# Patient Record
Sex: FEMALE | Race: WHITE
[De-identification: names, ages, dates, MRNs, and addresses within clinical notes are randomized per-mention and may not be internally consistent; named-entity substitution may affect disease eponyms.]

---

## 2018-01-08 ENCOUNTER — HOSPITAL ENCOUNTER (OUTPATIENT)
Dept: HOSPITAL 59 - SUR | Age: 43
LOS: 1 days | Discharge: HOME | End: 2018-01-09
Attending: PLASTIC SURGERY
Payer: MEDICAID

## 2018-01-08 ENCOUNTER — HOSPITAL ENCOUNTER (OUTPATIENT)
Dept: HOSPITAL 59 - SUR | Age: 43
LOS: 1 days | Discharge: HOME | End: 2018-01-09
Attending: SURGERY
Payer: MEDICAID

## 2018-01-08 DIAGNOSIS — M62.08: ICD-10-CM

## 2018-01-08 DIAGNOSIS — M54.9: ICD-10-CM

## 2018-01-08 DIAGNOSIS — K42.9: ICD-10-CM

## 2018-01-08 DIAGNOSIS — K42.9: Primary | ICD-10-CM

## 2018-01-08 DIAGNOSIS — L98.7: Primary | ICD-10-CM

## 2018-01-08 LAB
HCT VFR BLD CALC: 41.6 % (ref 35–47)
HGB BLD-MCNC: 13.7 GM/DL (ref 11.6–16)

## 2018-01-08 PROCEDURE — 85018 HEMOGLOBIN: CPT

## 2018-01-08 PROCEDURE — 49585: CPT

## 2018-01-08 PROCEDURE — 15830 EXC EXCESSIVE SKIN ABDOMEN: CPT

## 2018-01-08 PROCEDURE — 85014 HEMATOCRIT: CPT

## 2018-01-08 PROCEDURE — 00802 ANES PX LWR ABDL WAL PANNIC: CPT

## 2018-01-08 PROCEDURE — 00830 ANES HERNIA RPR LWR ABD NOS: CPT

## 2018-01-08 RX ADMIN — CEFAZOLIN SODIUM SCH MLS/HR: 1 SOLUTION INTRAVENOUS at 23:44

## 2018-01-08 RX ADMIN — TRAMADOL HYDROCHLORIDE PRN MG: 50 TABLET, FILM COATED ORAL at 22:20

## 2018-01-08 RX ADMIN — SODIUM CHLORIDE, SODIUM LACTATE, POTASSIUM CHLORIDE, AND CALCIUM CHLORIDE SCH MLS/HR: .6; .31; .03; .02 INJECTION, SOLUTION INTRAVENOUS at 18:47

## 2018-01-08 RX ADMIN — TRAMADOL HYDROCHLORIDE PRN MG: 50 TABLET, FILM COATED ORAL at 12:56

## 2018-01-08 RX ADMIN — DIAZEPAM PRN MG: 5 TABLET ORAL at 13:01

## 2018-01-08 RX ADMIN — IBUPROFEN PRN MG: 400 TABLET ORAL at 12:58

## 2018-01-08 RX ADMIN — CEFAZOLIN SODIUM SCH MLS/HR: 1 SOLUTION INTRAVENOUS at 16:22

## 2018-01-08 RX ADMIN — IBUPROFEN PRN MG: 400 TABLET ORAL at 22:17

## 2018-01-08 RX ADMIN — SODIUM CHLORIDE, SODIUM LACTATE, POTASSIUM CHLORIDE, AND CALCIUM CHLORIDE SCH MLS/HR: .6; .31; .03; .02 INJECTION, SOLUTION INTRAVENOUS at 11:30

## 2018-01-08 RX ADMIN — DIAZEPAM PRN MG: 5 TABLET ORAL at 22:18

## 2018-01-08 NOTE — OPERATIVE NOTE
This is a 2-part dictation. Please see Dr. Syed for his. 



DATE OF SURGERY: 01/08/2018



Surgeon: Arsen Fall DO



PREOPERATIVE DIAGNOSIS: Umbilical hernia. 



POSTOPERATIVE DIAGNOSIS: Umbilical hernia. 



OPERATION: Open umbilical herniorrhaphy with mesh. 



Indication: The patient is a 42-year-old female who was undergoing abdominoplasty with Dr. Syed. 
She did have an umbilical hernia. I saw her in the office a couple of times. We did discuss repair; 
risks, benefits, and alternatives. Risks include bleeding, infection, acute or chronic pain, 
recurrence. She understood this fully. 



PROCEDURE: Therefore, Dr. Syed did start the case. He was nice enough to expose the entire 
abdominal wall. When I came in, anterior fascia was completely exposed. She did have an umbilical 
hernia sac exposed as well with an element of diastasis. We did excise the hernia sac. The hernia 
defect measured about 1.5 cm. At this time, an [address] mesh was obtained. This was 
placed in the intraperitoneal position. We had excellent overlap of the fascia. This was sutured in 
place with 2-0 Vicryl in 4 spots. She did tolerate the procedure well. The rest of the case was 
turned over to Dr. Syed. 
[facility] HospitalMICHELLE

## 2018-01-09 RX ADMIN — SODIUM CHLORIDE, SODIUM LACTATE, POTASSIUM CHLORIDE, AND CALCIUM CHLORIDE SCH MLS/HR: .6; .31; .03; .02 INJECTION, SOLUTION INTRAVENOUS at 01:06

## 2018-01-09 RX ADMIN — TRAMADOL HYDROCHLORIDE PRN MG: 50 TABLET, FILM COATED ORAL at 12:08

## 2018-01-09 RX ADMIN — CEFAZOLIN SODIUM SCH MLS/HR: 1 SOLUTION INTRAVENOUS at 07:46

## 2018-01-09 RX ADMIN — DIAZEPAM PRN MG: 5 TABLET ORAL at 07:46

## 2018-01-09 RX ADMIN — IBUPROFEN PRN MG: 400 TABLET ORAL at 06:10

## 2018-01-09 RX ADMIN — TRAMADOL HYDROCHLORIDE PRN MG: 50 TABLET, FILM COATED ORAL at 06:10

## 2018-01-09 RX ADMIN — SODIUM CHLORIDE, SODIUM LACTATE, POTASSIUM CHLORIDE, AND CALCIUM CHLORIDE SCH: .6; .31; .03; .02 INJECTION, SOLUTION INTRAVENOUS at 06:42

## 2018-01-09 NOTE — OPERATIVE NOTE
DATE OF SURGERY: 01/08/2018 



PREOPERATIVE DIAGNOSES:

1. Umbilical hernia. 

2. Abdominal wall deformity and severe diastasis recti. 



POSTOPERATIVE DIAGNOSES:

1. Umbilical hernia. 

2. Abdominal wall deformity and severe diastasis recti. 



OPERATION: Abdominoplasty with plication. 



Surgeon: Maximiliano Syed MD 



Anesthesia: General. 



Estimated Blood Loss: Minimal. 



DRAINS: 15 round Gold. 



SPECIMENS: 900 g of tissue. 



Indication: The patient is a 42-year-old patient who has significant abdominal wall deformity which 
appears clinically to have a large ventral hernia and a very chronic umbilical hernia. I marked her 
in the standing position preoperatively for panniculectomy and explained to her because the 
umbilical hernia sac is pressed up against her umbilicus, it is more likely than note, quite 
frankly, that she was going to lose her umbilicus. 



PROCEDURE: She was then taken to the operating room and under satisfactory general anesthesia and 
all pressure points well padded and protected, PDS stockings were placed. Prepped and draped in the 
usual sterile manner. We circumscribed her umbilicus and the hernia sac. The hernia skin of the 
umbilicus was essentially the top of the hernia sac and had been thinned and attenuated. We then 
incised along the inferior border of the incision and raised the abdominal flap in a suprafascial 
plane leaving a layer of lymphatics and subcu on the fascia up around to expose the umbilicus and 
the umbilical hernia. 



At this point, Dr. Fall came in anemia repaired the umbilical hernia. It should be noted that as 
expected, there was no way to salvage the skin which was essentially the top of the hernia sac. 



Upon completion of the hernia repair, we then elevated the flap to the xiphoid and costal margins. 
We then plicated very meticulously with a running 0 PDS and because she was so thin, making sure 
that we did not leave a large imprint. The plication essentially imbricated the hernia repair for 
another layer of protection. Upon completion of the abdominal wall plication, we placed subfascial 
0.25% Marcaine 20 mL. Because of the significant plication that was needed, we had to elevate and 
immobilize more of the flaps laterally carefully so as not to devascularize them. We sat her up 45 
degrees, proceeded with appropriate skin resection. Once we advanced the skin, it showed no signs of 
any tethering. We then placed a 15 round drain and secured it with nylon. We then closed 
temporarily, checked the symmetry of shape and location of the scar. We were able to excise a little 
bit more along the inferior midline, give her a flatter contour and bring the scar down even a 
little bit lower, and this was done. We then again closed temporarily by advancing lateral to 
medial. Once we had appropriate tension and a nice contour and symmetrical scar, we then closed with 
several interrupted Infrasorb dissolvable staples 0.5 mm apart. We then completed closure with 
running subcuticular 3-0 Monocryl. Sterile dressings were applied. She tolerated procedure well 
without complication. 
BEAR